# Patient Record
Sex: MALE | URBAN - METROPOLITAN AREA
[De-identification: names, ages, dates, MRNs, and addresses within clinical notes are randomized per-mention and may not be internally consistent; named-entity substitution may affect disease eponyms.]

---

## 2022-08-26 ENCOUNTER — APPOINTMENT (RX ONLY)
Dept: URBAN - METROPOLITAN AREA CLINIC 88 | Facility: CLINIC | Age: 57
Setting detail: DERMATOLOGY
End: 2022-08-26

## 2022-08-26 DIAGNOSIS — T07XXXA ABRASION OR FRICTION BURN OF OTHER, MULTIPLE, AND UNSPECIFIED SITES, WITHOUT MENTION OF INFECTION: ICD-10-CM | Status: INADEQUATELY CONTROLLED

## 2022-08-26 DIAGNOSIS — Z72.0 TOBACCO USE: ICD-10-CM

## 2022-08-26 PROBLEM — S80.922A UNSPECIFIED SUPERFICIAL INJURY OF LEFT LOWER LEG, INITIAL ENCOUNTER: Status: ACTIVE | Noted: 2022-08-26

## 2022-08-26 PROCEDURE — ? ADDITIONAL NOTES

## 2022-08-26 PROCEDURE — ? COUNSELING

## 2022-08-26 PROCEDURE — 99202 OFFICE O/P NEW SF 15 MIN: CPT

## 2022-08-26 PROCEDURE — ? SMOKING CESSATION COUNSELING

## 2022-08-26 ASSESSMENT — LOCATION ZONE DERM: LOCATION ZONE: LEG

## 2022-08-26 ASSESSMENT — LOCATION SIMPLE DESCRIPTION DERM: LOCATION SIMPLE: LEFT PRETIBIAL REGION

## 2022-08-26 ASSESSMENT — LOCATION DETAILED DESCRIPTION DERM: LOCATION DETAILED: LEFT DISTAL PRETIBIAL REGION

## 2022-08-26 NOTE — PROCEDURE: ADDITIONAL NOTES
Additional Notes: Keep greesy and wrap tightly. Walking will help with swelling. Wearing compression socks.
Detail Level: Simple
Render Risk Assessment In Note?: no

## 2022-08-26 NOTE — PROCEDURE: SMOKING CESSATION COUNSELING
Counseling Text: I acknowledge that the patient is a smoker and advised him to consult with his PCP.
Detail Level: Detailed